# Patient Record
(demographics unavailable — no encounter records)

---

## 2024-12-10 NOTE — ADDENDUM
[FreeTextEntry1] : Bilateral thyroid nodules detected No other studies were available for comparison As patient previously had imaging done at [facility] I would recommend patient submit any old imaging from prior imaging facility to the [facility] so that they can undergo comparison studies Further recommendations to come once patient does this otherwise as of right now we will plan for repeat thyroid ultrasound in 1 year

## 2024-12-10 NOTE — HISTORY OF PRESENT ILLNESS
[FreeTextEntry1] : cata [de-identified] : -PMH: Thyroid Nodules, ROSENDO -SH: . 2 children. Part-time Mammogram tech. Occasional EtOH use. Non-smoker  OWEN is a 44 year F whom is here today for f/u ROSENDO reports feeling improved anxiety   -Thyroid Nodules: (2/18/2020) Thyroid US: Mildly enlarged heterogeneous thyroid gland, little change in comparison to prior study on 1/2/17. Dominant nodule in the right lobe is decreased in size. Other smaller nodules and cysts demonstrated little change. Was given a prescription for repeat thyroid ultrasound at time of last visit but never obtained  -Infrequent Migraines: Little benefit from Imitrex 25mg.

## 2024-12-10 NOTE — ADDENDUM
[FreeTextEntry1] : Bilateral thyroid nodules detected No other studies were available for comparison As patient previously had imaging done at [facility] I would recommend patient submit any old imaging from prior imaging facility to the [facility] so that they can undergo comparison studies Further recommendations to come once patient does this otherwise as of right now we will plan for repeat thyroid ultrasound in 1 year

## 2024-12-10 NOTE — HISTORY OF PRESENT ILLNESS
[FreeTextEntry1] : cata [de-identified] : -PMH: Thyroid Nodules, ROSENDO -SH: . 2 children. Part-time Mammogram tech. Occasional EtOH use. Non-smoker  OWEN is a 44 year F whom is here today for f/u ROSENDO reports feeling improved anxiety   -Thyroid Nodules: (2/18/2020) Thyroid US: Mildly enlarged heterogeneous thyroid gland, little change in comparison to prior study on 1/2/17. Dominant nodule in the right lobe is decreased in size. Other smaller nodules and cysts demonstrated little change. Was given a prescription for repeat thyroid ultrasound at time of last visit but never obtained  -Infrequent Migraines: Little benefit from Imitrex 25mg.

## 2025-06-12 NOTE — ASSESSMENT
[Vaccines Reviewed] : Immunizations reviewed today. Please see immunization details in the vaccine log within the immunization flowsheet.  [FreeTextEntry1] : Specialists Involved -[facility] [phone] -OBGYN: Dr. Mariaa Carranza   EKG obtained in office today demonstrates NSR. normal axis/Intervals. Good-R-wave progression. Normal EKG.   -F/u labs drawn in office today -Further recs pending lab results -Continue annual f/u w/ Gyn (Pap & Mammogram) -RTO 1yr for CPE or sooner if needed

## 2025-06-12 NOTE — HEALTH RISK ASSESSMENT
[Yes] : Yes [Monthly or less (1 pt)] : Monthly or less (1 point) [1 or 2 (0 pts)] : 1 or 2 (0 points) [Never (0 pts)] : Never (0 points) [No] : In the past 12 months have you used drugs other than those required for medical reasons? No [0] : 2) Feeling down, depressed, or hopeless: Not at all (0) [PHQ-2 Negative - No further assessment needed] : PHQ-2 Negative - No further assessment needed [Never] : Never [Patient reported mammogram was normal] : Patient reported mammogram was normal [Patient reported PAP Smear was normal] : Patient reported PAP Smear was normal [HIV test declined] : HIV test declined [Hepatitis C test declined] : Hepatitis C test declined [Change in mental status noted] : Change in mental status noted [None] : None [With Family] : lives with family [Employed] : employed [] :  [# Of Children ___] : has [unfilled] children [Sexually Active] : sexually active [Reviewed no changes] : Reviewed, no changes [Excellent] : ~his/her~ current health as excellent [Audit-CScore] : 1 [MME2Rqycf] : 0 [High Risk Behavior] : no high risk behavior [Reports changes in hearing] : Reports no changes in hearing [Reports changes in vision] : Reports no changes in vision [MammogramDate] : 02/25 [PapSmearDate] : 01/25 [PapSmearComments] : per patient [AdvancecareDate] : 06/25

## 2025-06-12 NOTE — HISTORY OF PRESENT ILLNESS
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 Non-smoker  OWEN is a 44 year F whom is here today for an annual well check states she is on a compounded semiglutide  Specialists Involved -[facility] [phone] -OBGYN: Dr. Mariaa Carranza  -Vaccines: All up to date -Pap Smear: 2022 -Mammogram: 2024. Reports h/o R. breast cyst -FH of Breast, Colon or Ovarian CA: Maternal Aunt had Breast CA  -Thyroid Nodules: (2020) Thyroid US: Mildly enlarged heterogeneous thyroid gland, little change in comparison to prior study on 17. Dominant nodule in the right lobe is decreased in size. Other smaller nodules and cysts demonstrated little change. Was given a prescription for repeat thyroid ultrasound at time of last visit but never obtained  -Infrequent Migraines: Little benefit from Imitrex 25mg.

## 2025-07-21 NOTE — PHYSICAL EXAM
[FreeTextEntry1] :           GENERAL PHYSICAL EXAM:   GEN: no distress, normal affect EYES: sclera white, conjunctiva clear, no nystagmus NECK: supple CV: normal rate, rhythm PULM: no respiratory distress, normal rhythm and effort EXT: no edema, no cyanosis MSK: normal strength, mobility SKIN: warm, dry, no rash or lesion on exposed skin        NEUROLOGICAL EXAM:   Mental Status   Orientation: alert and oriented to person, place, time, and situation   Language: clear and fluent, intact comprehension and repetition. No dysarthria.       Cranial Nerves   II: visual fields full to confrontation   III, IV, VI: PERRL, EOMI   V, VII: facial sensation and movement intact and symmetric   VIII: hearing intact   IX, X: uvula midline, soft palate elevates normally   XI: BL shoulder shrug intact   XII: tongue midline       Motor   Shoulder abduction: 5 (R), 5 (L)   UE flexion: 5 (R), 5 (L)   UE extension: 5 (R), 5 (L)   Hand : 5 (R), 5 (L)   Hip flexion: 5 (R), 5 (L)   Knee flexion: 5 (R), 5 (L)   Knee extension: 5 (R), 5 (L)   Dorsiflexion: 5 (R), 5 (L)   Plantar flexion: 5 (R), 5 (L)       Tone and bulk are normal in upper and lower limbs   No pronator drift       Sensation   Intact to light touch in all 4 EXTs       Reflex   2+ in BL biceps, patella       Coordination   Normal FTN bilaterally       Gait   Normal ambulation with no imbalance    Negative Romberg

## 2025-07-21 NOTE — DATA REVIEWED
[de-identified] : MRI of the brain was done on December 23, 2024.  I reviewed the images.  It did not show acute stroke mass or bleed.  There is scattered FLAIR hyperintensities suggestive of post migraine effect.

## 2025-07-21 NOTE — ASSESSMENT
[FreeTextEntry1] : This is an initial visit for 43 y/o female with chief complaint of migraine with aura. Migraines started when she was 17 y/o and described as severe squeezing sensation 10/10 which occurs after episode of black spots in her vision field. Previously taking Imitrex 50mg 1-2 tabs per episode but last migraine took 3 tabs without full relief. Recent MRI revealed few chronic white matter changes consistent with migraines.   Will trial Ubrelvy 100mg at onset of next migraine, samples given. Consistently maintain fluid intake > 64 oz per day. Maintain consistent caffeine intake.  I advised the patient to keep a headache journal to try and identify triggers and track headache frequency and response to treatment. Follow up visit in 6 months to evaluate treatment. If develops increase in frequency of migraines or change in characteristics she was instructed to notify us.

## 2025-07-21 NOTE — HISTORY OF PRESENT ILLNESS
[FreeTextEntry1] :  This is 43 y/o female chief complaint migraine with aura which started at age 16. Described episodes as starting with black spots in her upper left field of vision and then develops 10/10 crushing head pain. Severe migraines occur 1-2 times per year. Last migraine June 2025. When she was younger, she used to develop n/v, continues to have photophobia, phonophobia requiring isolating to dark quiet room. She does not note a specific trigger for migraine.  Has been taking sumatriptan 50mg tabs x 3 with some relief along with Excedrin; but feels they could be better managed.  In addition to twice yearly migraines, she does experience general headaches relieved with Imitrex/ or Excedrin with less frequency than previous years.   Has been also receiving Botox injections to forehead which may be helping.  Patient is not on oral contraceptives, has IUD.    Ass'd Symptoms: Nausea in past Vomiting in past Photophobia + Phonophobia + Blurred Vision with aura     Imaging: MRI brain12/23/24 IMPRESSION: Chronic white matter changes suggesting stigmata of chronic migraines.   Sinus/Allergies: + Head Trauma: -   FH: Cousin has MS, Grandfather on fathers side ALS.

## 2025-07-21 NOTE — CONSULT LETTER
[Dear  ___] : Dear  [unfilled], [Consult Letter:] : I had the pleasure of evaluating your patient, [unfilled]. [Please see my note below.] : Please see my note below. [Consult Closing:] : Thank you very much for allowing me to participate in the care of this patient.  If you have any questions, please do not hesitate to contact me. [Sincerely,] : Sincerely, [FreeTextEntry3] : Jose Matthew M.D., Ph.D. DPN-N [facility] [place] Director, Division of Neurology Director, Comprehensive St[facility]